# Patient Record
Sex: MALE | Race: WHITE | Employment: FULL TIME | URBAN - METROPOLITAN AREA
[De-identification: names, ages, dates, MRNs, and addresses within clinical notes are randomized per-mention and may not be internally consistent; named-entity substitution may affect disease eponyms.]

---

## 2021-01-12 ENCOUNTER — OFFICE VISIT (OUTPATIENT)
Dept: ORTHOPEDIC SURGERY | Age: 56
End: 2021-01-12
Payer: COMMERCIAL

## 2021-01-12 VITALS — WEIGHT: 210 LBS | BODY MASS INDEX: 28.44 KG/M2 | HEIGHT: 72 IN

## 2021-01-12 DIAGNOSIS — G56.01 CARPAL TUNNEL SYNDROME ON RIGHT: Primary | ICD-10-CM

## 2021-01-12 DIAGNOSIS — M79.641 RIGHT HAND PAIN: ICD-10-CM

## 2021-01-12 PROCEDURE — 99214 OFFICE O/P EST MOD 30 MIN: CPT | Performed by: ORTHOPAEDIC SURGERY

## 2021-01-12 RX ORDER — SIMVASTATIN 20 MG/1
TABLET, FILM COATED ORAL
COMMUNITY
Start: 2020-12-17

## 2021-01-12 NOTE — PROGRESS NOTES
Name: Aaron Genao    : 1965     Service Dept: [facility]    Patient's Pharmacies:  No Pharmacies Listed     Chief Complaint   Patient presents with    Hand Pain        Visit Vitals  Ht 6' (1.829 m)   Wt 210 lb (95.3 kg)   BMI 28.48 kg/m²      No Known Allergies   Current Outpatient Medications   Medication Sig Dispense Refill    simvastatin (ZOCOR) 20 mg tablet TAKE 1 TABLET BY MOUTH EVERY DAY        There is no problem list on file for this patient. No family history on file. Social History     Socioeconomic History    Marital status:      Spouse name: Not on file    Number of children: Not on file    Years of education: Not on file    Highest education level: Not on file   Tobacco Use    Smoking status: Current Every Day Smoker     Packs/day: 1.00    Smokeless tobacco: Never Used   Substance and Sexual Activity    Alcohol use: Yes      No past surgical history on file. Past Medical History:   Diagnosis Date    High cholesterol         I have reviewed and agree with [address] [place] [address] Nw and ROS and intake form in chart and the record. Review of Systems:   Patient is a pleasant appearing individual, appropriately dressed, well hydrated, well nourished, who is alert, appropriately oriented for age, and in no acute distress with a normal gait and normal affect who does not appear to be in any significant pain. Physical Exam:  Right Wrist - Positive Carpal Compression test, Full Range of motion of the wrist, No Instability, Positive for numbness, Mild swelling, Decreased  strength, Positive for muscle atrophy, Good cap refill. Left Wrist - Negative for Carpal Compression test, Ful Range of motion of the wrist, No Instability, No Numbness, No Swelling, No Weakness, No Muscle atrophy, Good cap refill. Encounter Diagnoses     ICD-10-CM ICD-9-CM   1. Carpal tunnel syndrome on right  G56.01 354.0   2.  Right hand pain  M79.641 729.5 HPI:  The patient is here with a chief complaint of right upper extremity numbness and tingling, progressively getting worse. Nothing has helped. Pain is 9/10. ROS:  10-point review of systems is positive for nighttime pain. X-rays are unremarkable. The patient was diagnosed with right carpal tunnel. Assessment/Plan:  Plan would be for right carpal tunnel release with basic blood work, no medical clearance, once he gets set up with insurance companies and go from there. Return to Office: Follow-up and Dispositions    · Return for [place] [address]. Scribed by Winnie Patel as dictated by [facility] Mustafa MD.  Documentation True and Accepted John Mustafa MD

## 2021-01-12 NOTE — PATIENT INSTRUCTIONS
Carpal Tunnel Syndrome: Care Instructions Your Care Instructions Carpal tunnel syndrome is a nerve problem. It can cause tingling, numbness, weakness, or pain in the fingers, thumb, and hand. The median nerve and several tough tissues called tendons run through a space in the wrist called the carpal tunnel. The repeated hand motions used in work and some hobbies and sports can put pressure on the nerve. Pregnancy and several conditions, including diabetes, arthritis, and an underactive thyroid, also can cause carpal tunnel syndrome. You may be able to limit an activity or do it differently to reduce your symptoms. You also can take other steps to feel better. If your symptoms are mild, 1 to 2 weeks of home treatment are likely to ease your pain. Surgery is needed only if other treatments do not work. Follow-up care is a key part of your treatment and safety. Be sure to make and go to all appointments, and call your doctor if you are having problems. It's also a good idea to know your test results and keep a list of the medicines you take. How can you care for yourself at home? · If possible, stop or reduce the activity that causes your symptoms. If you cannot stop the activity, take frequent breaks to rest and stretch or change hand positions to do a task. Try switching hands, such as when using a computer mouse. · Try to avoid bending or twisting your wrists. · Ask your doctor if you can take an over-the-counter pain medicine, such as acetaminophen (Tylenol), ibuprofen (Advil, Motrin), or naproxen (Aleve). Be safe with medicines. Read and follow all instructions on the label. · If your doctor prescribes corticosteroid medicine to help reduce pain and swelling, take it exactly as prescribed. Call your doctor if you think you are having a problem with your medicine. · Put ice or a cold pack on your wrist for 10 to 20 minutes at a time to ease pain. Put a thin cloth between the ice and your skin. · If your doctor or your physical or occupational therapist tells you to wear a wrist splint, wear it as directed to keep your wrist in a neutral position. This also eases pressure on your median nerve. · Ask your doctor whether you should have physical or occupational therapy to learn how to do tasks differently. · Try a yoga class to stretch your muscles and build strength in your hands and wrists. Yoga has been shown to ease carpal tunnel symptoms. To prevent carpal tunnel · When working at a computer, keep your hands and wrists in line with your forearms. Hold your elbows close to your sides. Take a break every 10 to 15 minutes. · Try these exercises: ¨ Warm up: Rotate your wrist up, down, and from side to side. Repeat this 4 times. Stretch your fingers far apart, relax them, then stretch them again. Repeat 4 times. Stretch your thumb by pulling it back gently, holding it, and then releasing it. Repeat 4 times. ¨ Prayer stretch: Start with your palms together in front of your chest just below your chin. Slowly lower your hands toward your waistline while keeping your hands close to your stomach and your palms together until you feel a mild to moderate stretch under your forearms. Hold for 10 to 20 seconds. Repeat 4 times. ¨ Wrist flexor stretch: Hold your arm in front of you with your palm up. Bend your wrist, pointing your hand toward the floor. With your other hand, gently bend your wrist further until you feel a mild to moderate stretch in your forearm. Hold for 10 to 20 seconds. Repeat 4 times. ¨ Wrist extensor stretch: Repeat the steps for the wrist flexor stretch, but begin with your extended hand palm down. · Squeeze a rubber exercise ball several times a day to keep your hands and fingers strong. · Avoid holding objects (such as a book) in one position for a long time. When possible, use your whole hand to grasp an object.  Using just the thumb and index finger can put stress on the wrist. 
 · Do not smoke. It can make this condition worse by reducing blood flow to the median nerve. If you need help quitting, talk to your doctor about stop-smoking programs and medicines. These can increase your chances of quitting for good. When should you call for help? Watch closely for changes in your health, and be sure to contact your doctor if: 
? · Your pain or other problems do not get better with home care. ? · You want more information about physical or occupational therapy. ? · You have side effects of your corticosteroid medicine, such as: 
¨ Weight gain. ¨ Mood changes. ¨ Trouble sleeping. ¨ Bruising easily. ? · You have any other problems with your medicine. Where can you learn more? Go to http://www.gray.com/. Enter R432 in the search box to learn more about \"Carpal Tunnel Syndrome: Care Instructions. \" Current as of: March 21, 2017 Content Version: 11.5 © 2009-1688 [employer]. Care instructions adapted under license by [employer] (which disclaims liability or warranty for this information). If you have questions about a medical condition or this instruction, always ask your healthcare professional. [place] [address] any warranty or liability for your use of this information.

## 2021-02-05 ENCOUNTER — HOSPITAL ENCOUNTER (OUTPATIENT)
Dept: PHYSICAL THERAPY | Age: 56
Discharge: HOME | End: 2021-02-05
Payer: COMMERCIAL

## 2021-02-05 PROCEDURE — 97165 OT EVAL LOW COMPLEX 30 MIN: CPT

## 2021-02-05 PROCEDURE — 97110 THERAPEUTIC EXERCISES: CPT

## 2021-02-05 PROCEDURE — 97535 SELF CARE MNGMENT TRAINING: CPT

## 2021-02-05 NOTE — PROGRESS NOTES
[facility]  [address]  [place], [address]  [phone] fax    Plan of Care/Statement of Necessity for Occupational Therapy Services    Patient name: Lex Araiza Start of Care: 2021   Referral source: Dino Browne MD : 1965    Medical Diagnosis: Right hand pain [M79.641]  Payor: BLUE [employer] / Plan: [address] [place] [address] / Product Type: PPO /  Onset Date:2021    Treatment Diagnosis: right wrist pain   Prior Hospitalization: see medical history Provider#: [id number]   Medications: Verified on Patient summary List    Comorbidities: Arthritis, tobacco use   Prior Level of Function: Independent with ADL/IADL tasks without functional limitations of dominant right hand. The Plan of Care and following information is based on the information from the initial evaluation. Assessment/ key information:  Patient is a right hand dominant 54 y.o. male with a chief complaint of right wrist pain s/p carpal tunnel release on 21. 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 Pt presented to skilled OT today with dressing donned to right hand and wrist. Upon removal there is a 2 cm incision site on the volar aspect of the palm that is intact with sutures and no drainage present. He is able to make a loose fist and oppose thumb to finger tips. There is no tenderness with palpation to the wrist and hand. His wrist ROM is limited to 39 degrees flexion and 46 degrees extension without discomfort. There is no edema present at this time however bruising about the volar aspect of the wrist is noted. He reports continued paresthesias of the middle digit but overall improved since the surgical release. Patient received an initial evaluation today followed by education as to diagnosis, precautions and treatment plan.   Patient was provided with a basic home exercise program including wrist ROM and tendon glides. Skilled OT services are necessary to address deficits and promote healing of surgical site. Evaluation Complexity: History LOW Complexity : Brief history review  Examination LOW Complexity : 1-3 performance deficits relating to physical, cognitive , or psychosocial skils that result in activity limitations and / or participation restrictions  Clinical Decision Making LOW Complexity : No comorbidities that affect functional and no verbal or physical assistance needed to complete eval tasks   Overall Complexity Rating: LOW   Patient would benefit from OT/Hand therapy services for the following problems:  Problem List: Pain effecting function, Decreased range of motion, Decreased strength, Edema effecting function, Decreased coordination/prehension, Decreased ADL/functional abilities , Decreased activity tolerance, Decreased flexibility/joint mobility, Decreased transfer abilities and Sensability              Treatment Plan may include any combination of the following: Therapeutic exercise, Therapeutic activities, Physical agent/modality, Scar management, Manual therapy, Splinting/orthoses, Wound care, Patient education and ADLs/IADLs   Patient / Family readiness to learn indicated by: asking questions, trying to perform skills and interest   Persons(s) to be included in education:   patient (P)   Barriers to Learning/Limitations: None  Patient Goal (s): get the gel splint   Patient Self Reported Health Status: good  Rehabilitation Potential: good  Short Term Goals: To be accomplished in 2  weeks:  1. Patient will be compliant with initial home exercise program to take an active role in their rehabilitation process. Status at [facility]: Patient was provided with a basic home exercise program including wrist ROM and tendon glides.       2. Patient will demonstrate a good understanding of their condition and strategies for self-management.   Status at [facility]: pt educated on  A&P carpal tunnel release, activity modifications, splint wear at night, prognosis, treatment plan, wound care, edema mgmt     Long Term Goals: To be accomplished in 4 weeks:              1. Patient will have 55 degrees of right wrist extension in order to increase ease with ADL's such as bathing, eating and dressing and to eventually bear weight thru the right upper extremity as in pushing up from a chair. Status at Eval: 46 degrees     2. Patient will have 55 degrees of right wrist flexion in order to perform hygiene tasks and /or work with tools such as a screwdriver. Status at Eval: 39 degrees     3. Patient will show a 10 point improvement on FOTO functional status measure to improve overall functional performance. Status at Eval: 44    4. Patient will demonstrate ability to make a composite fist in order to keep coins secure. Status at eval: loose fist noted     Frequency / Duration: Patient to be seen 1-2 times per week for 4 weeks:     Patient/ Caregiver education and instruction: Diagnosis, prognosis, self care, activity modification and exercises  [x]  Plan of care has been reviewed with Naveed Morejon OT 2/5/2021 11:32 AM  ________________________________________________________________________    I certify that the above Therapy Services are being furnished while the patient is under my care. I agree with the treatment plan and certify that this therapy is necessary.     Physician's Signature:____________Date:_________TIME:________    ** Signature, Date and Time must be completed for valid certification **    Please sign and return to [facility] [address]  [address]  [place], [address]  [phone] fax

## 2021-02-05 NOTE — PROGRESS NOTES
Hand Therapy Evaluation and Daily Note    Patient Name: Kurtis Ibarra  Date:2021  : 1965  Age: 54 y.o.y/o  [x]  Patient  Verified  Payor: [employer] / Plan: [address] [place] [address] / Product Type: PPO /    Referring Provider: MD Ya Parada MD Visit:  2021  Onset Date:  1 year  Surgical Date: 2021  Surgical Procedure: right open carpal tunnel release    In time:10:45 AM  Out time:11:30 AM  Total Treatment Time (min): 45  Total Timed Codes (min): 30  1:1 Treatment Time (MC only): 45   Visit #: 1 of 4    Treatment Area: Right hand pain [M79.641]    Precautions:    Hand Dominance: right handed   Hand Involved: right    Total Evaluation Time:  15    History of Present Condition:  Patient is a right hand dominant 54 y.o. male with a chief complaint  of right wrist pain s/p carpal tunnel release on 21. Pain Rating:   Current: (0-no pain 10-debilitating pain) no   At best: (0-no pain 10-debilitating pain) no  At worst: (0-no pain 10-debilitating pain) no  Location: right wrist  Type:  no   Better with: Worse with:     Medications/Allergies/Past Medical History:  See chart; reviewed with patient. Arthritis, tobacco use    Diagnostic Tests: nerve conduction study dated 2018 demonstrated moderate right median neuropathy at the wrist.     Prior Level of Function: Independent with ADL/IADL tasks without functional limitations of dominant right hand.     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 24.1/24.3    Wrist Crease 19.9/20.3    FA      Elbow         ADLs  Feeding:        []MaxA   []ModA   []Sheila   [] CGA   []SBA   []Ale   [x]Independent  UE Dressing:       []MaxA   []ModA   []Sheila   [] CGA   []SBA   []Ale   [x]Independent  LE Dressing:       []MaxA   []ModA   []Sheila   [] CGA   []SBA   []Ale   [x]Independent  Grooming:       []MaxA   []ModA   []Sheila   [] CGA   []SBA   []Ale   [x]Independent  Toileting:       []MaxA   []ModA   []Sheila   [] CGA   []SBA   []Ale   [x]Independent  Bathing:       []MaxA   []ModA   []Sheila   [] CGA   []SBA   []Ale   [x]Independent  Light Meal Prep:    []MaxA   []ModA   []Sheila   [] CGA   []SBA   []Ale   [x]Independent  Household/Other: []MaxA   []ModA   []Sheila   [] CGA   []SBA   []Ale   []Independent  Adaptive Equip:     []MaxA   []ModA   []Sheila   [] CGA   []SBA   []Ale   []Independent  Driving:       []MaxA   []ModA   []Sheila   [] CGA   []SBA   []Ale   [x]Independent      Todays Treatment:  Patient received an initial evaluation today followed by education as to diagnosis, precautions and treatment plan. Patient was provided with a basic home exercise program including wrist ROM and tendon glides. OBJECTIVE  10 min Therapeutic Exercise:  [x] See flow sheet :   Rationale: increase ROM and tendon glides to improve the patients ability to make a fist    20 min Self Care/Home Management: A&P carpal tunnel release, activity modifications, splint wear at night, prognosis, treatment plan, wound care, edema mgmt   Rationale: education  to improve the patients ability to promote healing of surgical site.      With   [] TE   [] TA   [] neuro   [] other: Patient Education: [x] Review HEP    [] Progressed/Changed HEP based on: [] positioning   [] body mechanics   [] transfers   [] heat/ice application   [] Splint wear/care   [] Sensory re-education   [] scar management      [] other:      Pain Level (0-10 scale) post treatment: 0/10    Patient will continue to benefit from skilled OT services to modify and progress therapeutic interventions, address ROM deficits, address strength deficits and instruct in home and community integration to attain goals. Assessment: Pt presented to skilled OT today with dressing donned to right hand and wrist. Upon removal there is a 2 cm incision site that is intact with sutures and no drainage present. He is able to make a loose fist and oppose thumb to finger tips. There is no tenderness with palpation to the wrist and hand. His wrist ROM is limited to 39 degrees flexion and 46 degrees extension without discomfort. There is no edema present at this time however bruising about the volar aspect of the wrist is noted. He reports continued paresthesias of the middle digit but overall improved since the surgical release.       Evaluation Complexity: History LOW Complexity : Brief history review  Examination LOW Complexity : 1-3 performance deficits relating to physical, cognitive , or psychosocial skils that result in activity limitations and / or participation restrictions  Clinical Decision Making LOW Complexity : No comorbidities that affect functional and no verbal or physical assistance needed to complete eval tasks   Overall Complexity Rating: LOW     Patient would benefit from OT/Hand therapy services for the following problems:  Problem List: Pain effecting function, Decreased range of motion, Decreased strength, Edema effecting function, Decreased coordination/prehension, Decreased ADL/functional abilities , Decreased activity tolerance, Decreased flexibility/joint mobility, Decreased transfer abilities and Sensability     Treatment Plan may include any combination of the following: Therapeutic exercise, Therapeutic activities, Physical agent/modality, Scar management, Manual therapy, Splinting/orthoses, Wound care, Patient education and ADLs/IADLs    Patient / Family readiness to learn indicated by: asking questions, trying to perform skills and interest    Persons(s) to be included in education:   patient (P)    Barriers to Learning/Limitations: None    Patient Goal (s): get the gel splint    Patient Self Reported Health Status: good    Rehabilitation Potential: good    Short Term Goals: To be accomplished in 2  weeks:  1. Patient will be compliant with initial home exercise program to take an active role in their rehabilitation process. Status at Eval: Patient was provided with a basic home exercise program including wrist ROM and tendon glides. 2. Patient will demonstrate a good understanding of their condition and strategies for self-management. Status at Eval: pt educated on  A&P carpal tunnel release, activity modifications, splint wear at night, prognosis, treatment plan, wound care, edema mgmt    Long Term Goals: To be accomplished in 4 weeks:   1. Patient will have 55 degrees of right wrist extension in order to increase ease with ADL's such as bathing, eating and dressing and to eventually bear weight thru the right upper extremity as in pushing up from a chair. Status at Eval: 46 degrees    2. Patient will have 55 degrees of right wrist flexion in order to perform hygiene tasks and /or work with tools such as a screwdriver. Status at Eval: 39 degrees    3. Patient will show a 10 point improvement on FOTO functional status measure to improve overall functional performance. Status at Eval: 44    4. Patient will demonstrate ability to make a composite fist in order to keep coins secure.   Status at eval: loose fist noted    Frequency / Duration: Patient to be seen 1-2 times per week for 4 weeks:    Patient/ Caregiver education and instruction: Diagnosis, prognosis, self care, activity modification and exercises    Rose Marie Barajas, OT,  2/5/2021 10:51 AM

## 2021-02-12 ENCOUNTER — HOSPITAL ENCOUNTER (OUTPATIENT)
Dept: PHYSICAL THERAPY | Age: 56
Discharge: HOME | End: 2021-02-12
Payer: COMMERCIAL

## 2021-02-12 PROCEDURE — 97110 THERAPEUTIC EXERCISES: CPT

## 2021-02-12 PROCEDURE — 97530 THERAPEUTIC ACTIVITIES: CPT

## 2021-02-12 NOTE — PROGRESS NOTES
OT DISCHARGE DAILY NOTE AND SUMMARY     Date:2021  Patient name: Kurtis Ibarra Start of Care: 2021   Referral source: Nancy Moseley MD : [id number]   Medical/Treatment Diagnosis: Right hand pain [M79.641] Onset Date:2021     Prior Hospitalization: see medical history Provider#: [id number]   Medications: Verified on Patient Summary List    Comorbidities: arthritis, tobacco use  Prior Level of Function: Independent in I/ADLs without limitations    Visits from Start of Care: 2    Missed Visits:0    Reporting Period : 2021 to 2021    [x]  Patient  Verified  Payor: BLUE [employer]OSS / Plan: [address] [place] [address] / Product Type: PPO /    In time:1100  Out time:1132  Total Treatment Time (min): 32  Visit #: 2 of 2    Medicare/BCBS Only   Total Timed Codes (min):  32 1:1 Treatment Time:  32       Treatment Area: Right hand pain [M79.641]    SUBJECTIVE  Pain Level (0-10 scale): 0  Any medication changes, allergies to medications, adverse drug reactions, diagnosis change, or new procedure performed?: [x] No    [] Yes (see summary sheet for update)  Subjective functional status/changes:   [] No changes reported  \"My doctor told me I can return to my normal activities after the weekend\"    OBJECTIVE    20 min Therapeutic Exercise:  [x] See flow sheet :   Rationale: increase ROM and improve coordination to improve the patients ability to improve functional use of right hand    12 min Therapeutic Activity:  [x]  See flow sheet : education on silicone pads for scar remodeling, activity modification   Rationale: increase ROM and promote scar remodeling and healing to improve the patients ability to use right hand for work tasks.      With   [] TE   [x] TA   [] neuro   [] other: Patient Education: [x] Review HEP   [] Progressed/Changed HEP based on:   [] positioning   [] body mechanics   [] transfers   [] heat/ice application   [] Splint wear/care   [] Sensory re-education   [] scar management [] other:            Other Objective/Functional Measures:   Range of Motion:   Wrist Active/Passive ROM  Wrist 2/5/2021 2/12/2021      Flex 39 58    Ext 46  51   UD 30     RD 20               Pain Level (0-10 scale) post treatment: 0    Summary of Care:  Short Term Goals: To be accomplished in 2  weeks:  1. Patient will be compliant with initial home exercise program to take an active role in their rehabilitation process. Status at Eval: Patient was provided with a basic home exercise program including wrist ROM and tendon glides. Status at HI: goal met     2. Patient will demonstrate a good understanding of their condition and strategies for self-management. Status at [facility]: pt educated on  A&P carpal tunnel release, activity modifications, splint wear at night, prognosis, treatment plan, wound care, edema mgmt  Status at HI: goal met. Educated on silicone pads and scar management strategies.     Long Term Goals: To be accomplished in 4 weeks:              1. Patient will have 55 degrees of right wrist extension in order to increase ease with ADL's such as bathing, eating and dressing and to eventually bear weight thru the right upper extremity as in pushing up from a chair. Status at Eval: 46 degrees  Status at DC: 51 degrees, progressing     2. Patient will have 55 degrees of right wrist flexion in order to perform hygiene tasks and /or work with tools such as a screwdriver. Status at [facility]: 39 degrees  Status at DC: 58. Goal met     3. Patient will show a 10 point improvement on FOTO functional status measure to improve overall functional performance. Status at Eval: 44  Status at DC: 53 nearly met     4. Patient will demonstrate ability to make a composite fist in order to keep coins secure. Status at eval: loose fist noted  Status at DC: full fist    ASSESSMENT/Changes in Function: Pt demonstrates improvement in ROM at right wrist, and reports independence with HEP.  Incision is healing without drainage or signs of infection. Pt reports no tenderness on the palpation near incision. Pt was educated on use of silicone pads to initiate scar remodeling (issued pads to the pt) and on scar massage techniques. Pt verbalized good understanding of all education, reporting he would like to be discharged to an [facility] at this time.       PLAN:  [x]Discontinue therapy: [x]Patient has reached or is progressing toward set goals      []Patient is non-compliant or has abdicated      []Due to lack of appreciable progress towards set goals    Thank you for this referral!    Collette Hunt, OTR/L 2/12/2021 11:10 AM

## 2021-07-12 DIAGNOSIS — M79.641 RIGHT HAND PAIN: ICD-10-CM

## 2023-11-06 ENCOUNTER — OFFICE VISIT (OUTPATIENT)
Age: 58
End: 2023-11-06
Payer: COMMERCIAL

## 2023-11-06 VITALS
SYSTOLIC BLOOD PRESSURE: 130 MMHG | HEIGHT: 72 IN | BODY MASS INDEX: 28.44 KG/M2 | DIASTOLIC BLOOD PRESSURE: 88 MMHG | HEART RATE: 85 BPM | OXYGEN SATURATION: 99 % | WEIGHT: 210 LBS

## 2023-11-06 DIAGNOSIS — E78.5 DYSLIPIDEMIA: ICD-10-CM

## 2023-11-06 DIAGNOSIS — R94.31 ABNORMAL EKG: ICD-10-CM

## 2023-11-06 DIAGNOSIS — I45.2 BIFASCICULAR BUNDLE BRANCH BLOCK: Primary | ICD-10-CM

## 2023-11-06 DIAGNOSIS — R06.09 DOE (DYSPNEA ON EXERTION): ICD-10-CM

## 2023-11-06 DIAGNOSIS — F17.200 TOBACCO USE DISORDER: ICD-10-CM

## 2023-11-06 DIAGNOSIS — R01.1 MURMUR: ICD-10-CM

## 2023-11-06 PROCEDURE — 93000 ELECTROCARDIOGRAM COMPLETE: CPT | Performed by: INTERNAL MEDICINE

## 2023-11-06 PROCEDURE — 99204 OFFICE O/P NEW MOD 45 MIN: CPT | Performed by: INTERNAL MEDICINE

## 2023-11-06 RX ORDER — SIMVASTATIN 40 MG
40 TABLET ORAL EVERY EVENING
COMMUNITY
Start: 2023-08-28

## 2023-11-06 RX ORDER — ACETAMINOPHEN 500 MG
500 TABLET ORAL EVERY 4 HOURS PRN
COMMUNITY
Start: 2023-10-09

## 2023-11-06 RX ORDER — TRAMADOL HYDROCHLORIDE 50 MG/1
50 TABLET ORAL EVERY 6 HOURS PRN
COMMUNITY
Start: 2023-09-26

## 2023-11-06 ASSESSMENT — ENCOUNTER SYMPTOMS
VOMITING: 0
ABDOMINAL PAIN: 0
ABDOMINAL DISTENTION: 0
SORE THROAT: 0
NAUSEA: 0
SHORTNESS OF BREATH: 0
COUGH: 0

## 2023-11-06 ASSESSMENT — ANXIETY QUESTIONNAIRES
3. WORRYING TOO MUCH ABOUT DIFFERENT THINGS: 0
4. TROUBLE RELAXING: 0
GAD7 TOTAL SCORE: 0
2. NOT BEING ABLE TO STOP OR CONTROL WORRYING: 0
6. BECOMING EASILY ANNOYED OR IRRITABLE: 0
5. BEING SO RESTLESS THAT IT IS HARD TO SIT STILL: 0
1. FEELING NERVOUS, ANXIOUS, OR ON EDGE: 0
7. FEELING AFRAID AS IF SOMETHING AWFUL MIGHT HAPPEN: 0

## 2023-11-06 ASSESSMENT — PATIENT HEALTH QUESTIONNAIRE - PHQ9
SUM OF ALL RESPONSES TO PHQ QUESTIONS 1-9: 0
SUM OF ALL RESPONSES TO PHQ9 QUESTIONS 1 & 2: 0
SUM OF ALL RESPONSES TO PHQ QUESTIONS 1-9: 0
2. FEELING DOWN, DEPRESSED OR HOPELESS: 0
1. LITTLE INTEREST OR PLEASURE IN DOING THINGS: 0

## 2023-11-06 NOTE — PROGRESS NOTES
11/06/23     Pat Naidu  is a 62 y.o. male     Chief Complaint   Patient presents with    New Patient     Referred for surgical clearance     Procedure     11/10/23: Right knee surgery with Dr. Joanie Flores at [facility]       HPI  Patient presents for a new office visit. He was referred for preoperative cardiac restratification prior to undergoing arthroscopic knee surgery which is scheduled for the end of this week. He does not have a previous cardiac history. He has a history of high cholesterol and borderline hypertension. He also has a history of prolonged tobacco use smoking about a pack of cigarettes a day for 40 years. He underwent a preoperative EKG last week which demonstrated a right bundle branch block with left anterior fascicular block. His prior EKG was well over 10 years ago which did not show these changes. Patient states that he is not quite as active as he was since his knee has been bothering him but he recently brought on all of the [employer] decorations from the attic and did not notice any exertional chest tightness or shortness of breath. He has been occasionally fatigued after physical activity but this has not limited his ability to do any work. He has not had any heart palpitations, dizzy spells, nor syncope. No leg swelling or claudication. Past Medical History:   Diagnosis Date    High cholesterol      Current Outpatient Medications   Medication Sig Dispense Refill    simvastatin (ZOCOR) 40 MG tablet Take 1 tablet by mouth every evening (Patient not taking: Reported on 11/6/2023)      traMADol (ULTRAM) 50 MG tablet Take 1 tablet by mouth every 6 hours as needed. Max Daily Amount: 200 mg (Patient not taking: Reported on 11/6/2023)      acetaminophen (TYLENOL) 500 MG tablet Take 1 tablet by mouth every 4 hours as needed (Patient not taking: Reported on 11/6/2023)       No current facility-administered medications for this visit.      Allergies   Allergen Reactions

## 2023-11-06 NOTE — PROGRESS NOTES
Kate Lung presents today for   Chief Complaint   Patient presents with    New Patient     Referred for surgical clearance     Procedure     11/10/23: Right knee surgery with Dr. Jhonatan Esquivel at [address] [place] [address]  preferred language for health care discussion is english/other. Is someone accompanying this pt? yes    Is the patient using any DME equipment during OV? no    Depression Screening:  Depression: Not at risk (11/6/2023)    PHQ-2     PHQ-2 Score: 0        Learning Assessment:  Who is the primary learner? Patient    What is the preferred language for health care of the primary learner? ENGLISH    How does the primary learner prefer to learn new concepts? DEMONSTRATION    Answered By patient    Relationship to Learner SELF           Pt currently taking Anticoagulant therapy? no    Pt currently taking Antiplatelet therapy ? no      Coordination of Care:  1. Have you been to the ER, urgent care clinic since your last visit? Hospitalized since your last visit? no    2. Have you seen or consulted any other health care providers outside of the [address] [place] [address] since your last visit? Include any pap smears or colon screening.  no

## 2023-11-08 ENCOUNTER — TELEPHONE (OUTPATIENT)
Age: 58
End: 2023-11-08